# Patient Record
Sex: MALE | Race: WHITE
[De-identification: names, ages, dates, MRNs, and addresses within clinical notes are randomized per-mention and may not be internally consistent; named-entity substitution may affect disease eponyms.]

---

## 2019-12-10 PROBLEM — Z00.00 ENCOUNTER FOR PREVENTIVE HEALTH EXAMINATION: Status: ACTIVE | Noted: 2019-12-10

## 2019-12-11 ENCOUNTER — RECORD ABSTRACTING (OUTPATIENT)
Age: 54
End: 2019-12-11

## 2019-12-11 ENCOUNTER — APPOINTMENT (OUTPATIENT)
Dept: FAMILY MEDICINE | Facility: CLINIC | Age: 54
End: 2019-12-11
Payer: COMMERCIAL

## 2019-12-11 VITALS
OXYGEN SATURATION: 98 % | BODY MASS INDEX: 28.14 KG/M2 | TEMPERATURE: 98.4 F | HEART RATE: 78 BPM | RESPIRATION RATE: 18 BRPM | SYSTOLIC BLOOD PRESSURE: 120 MMHG | WEIGHT: 190 LBS | DIASTOLIC BLOOD PRESSURE: 78 MMHG | HEIGHT: 69 IN

## 2019-12-11 DIAGNOSIS — M77.9 ENTHESOPATHY, UNSPECIFIED: ICD-10-CM

## 2019-12-11 DIAGNOSIS — Z82.49 FAMILY HISTORY OF ISCHEMIC HEART DISEASE AND OTHER DISEASES OF THE CIRCULATORY SYSTEM: ICD-10-CM

## 2019-12-11 DIAGNOSIS — H81.10 BENIGN PAROXYSMAL VERTIGO, UNSPECIFIED EAR: ICD-10-CM

## 2019-12-11 DIAGNOSIS — Z78.9 OTHER SPECIFIED HEALTH STATUS: ICD-10-CM

## 2019-12-11 DIAGNOSIS — M54.16 RADICULOPATHY, LUMBAR REGION: ICD-10-CM

## 2019-12-11 DIAGNOSIS — Z83.6 FAMILY HISTORY OF OTHER DISEASES OF THE RESPIRATORY SYSTEM: ICD-10-CM

## 2019-12-11 DIAGNOSIS — Z83.1 FAMILY HISTORY OF OTHER INFECTIOUS AND PARASITIC DISEASES: ICD-10-CM

## 2019-12-11 DIAGNOSIS — M47.816 SPONDYLOSIS W/OUT MYELOPATHY OR RADICULOPATHY, LUMBAR REGION: ICD-10-CM

## 2019-12-11 PROCEDURE — 99202 OFFICE O/P NEW SF 15 MIN: CPT

## 2019-12-11 NOTE — ASSESSMENT
[FreeTextEntry1] : Referred to PT for triceps tendinitis and vestibular therapy for Vertigo\par Take medication as instructed\par call if not well

## 2019-12-11 NOTE — HISTORY OF PRESENT ILLNESS
[FreeTextEntry8] : Episodes of dizziness, spinning, on and off for the last 4 days accompanied by nausea,\par no vomiting\par Also pain in left triceps for several weeks

## 2019-12-11 NOTE — HEALTH RISK ASSESSMENT
[No falls in past year] : Patient reported no falls in the past year [0] : 2) Feeling down, depressed, or hopeless: Not at all (0) [[id number]] : 0

## 2020-01-04 ENCOUNTER — TRANSCRIPTION ENCOUNTER (OUTPATIENT)
Age: 55
End: 2020-01-04

## 2020-09-30 ENCOUNTER — APPOINTMENT (OUTPATIENT)
Dept: FAMILY MEDICINE | Facility: CLINIC | Age: 55
End: 2020-09-30
Payer: COMMERCIAL

## 2020-09-30 VITALS
HEIGHT: 69 IN | RESPIRATION RATE: 16 BRPM | WEIGHT: 185 LBS | TEMPERATURE: 98.7 F | SYSTOLIC BLOOD PRESSURE: 132 MMHG | DIASTOLIC BLOOD PRESSURE: 70 MMHG | OXYGEN SATURATION: 99 % | BODY MASS INDEX: 27.4 KG/M2 | HEART RATE: 68 BPM

## 2020-09-30 DIAGNOSIS — Z23 ENCOUNTER FOR IMMUNIZATION: ICD-10-CM

## 2020-09-30 DIAGNOSIS — K14.0 GLOSSITIS: ICD-10-CM

## 2020-09-30 PROCEDURE — 99213 OFFICE O/P EST LOW 20 MIN: CPT | Mod: 25

## 2020-09-30 PROCEDURE — 90682 RIV4 VACC RECOMBINANT DNA IM: CPT

## 2020-09-30 PROCEDURE — 90471 IMMUNIZATION ADMIN: CPT

## 2020-09-30 RX ORDER — NAPROXEN 500 MG/1
500 TABLET ORAL
Qty: 30 | Refills: 0 | Status: DISCONTINUED | COMMUNITY
Start: 2019-12-11 | End: 2020-09-30

## 2020-09-30 RX ORDER — MECLIZINE HYDROCHLORIDE 25 MG/1
25 TABLET ORAL 3 TIMES DAILY
Qty: 45 | Refills: 0 | Status: DISCONTINUED | COMMUNITY
Start: 2019-12-11 | End: 2020-09-30

## 2020-09-30 RX ORDER — LIDOCAINE HYDROCHLORIDE 20 MG/ML
2 SOLUTION ORAL; TOPICAL
Qty: 450 | Refills: 1 | Status: ACTIVE | COMMUNITY
Start: 2020-09-30 | End: 1900-01-01

## 2020-09-30 NOTE — PLAN
[FreeTextEntry1] : Rx for viscous lidoc\par To see ENT, dr Del Rio if not improved\par received flu shot

## 2020-09-30 NOTE — HISTORY OF PRESENT ILLNESS
[FreeTextEntry8] : Burning sensation on tongue and  "bumpy" feeling\par above complaint  x 5 days\par Need flu shot

## 2020-09-30 NOTE — HEALTH RISK ASSESSMENT
[No falls in past year] : Patient reported no falls in the past year [0] : 2) Feeling down, depressed, or hopeless: Not at all (0) [[id number]] : 0

## 2023-09-12 ENCOUNTER — APPOINTMENT (OUTPATIENT)
Dept: FAMILY MEDICINE | Facility: CLINIC | Age: 58
End: 2023-09-12
Payer: COMMERCIAL

## 2023-09-12 VITALS
DIASTOLIC BLOOD PRESSURE: 78 MMHG | HEART RATE: 86 BPM | HEIGHT: 69 IN | OXYGEN SATURATION: 98 % | TEMPERATURE: 98 F | WEIGHT: 190 LBS | BODY MASS INDEX: 28.14 KG/M2 | RESPIRATION RATE: 16 BRPM | SYSTOLIC BLOOD PRESSURE: 134 MMHG

## 2023-09-12 DIAGNOSIS — F10.10 ALCOHOL ABUSE, UNCOMPLICATED: ICD-10-CM

## 2023-09-12 DIAGNOSIS — G62.89 OTHER SPECIFIED POLYNEUROPATHIES: ICD-10-CM

## 2023-09-12 PROCEDURE — 36415 COLL VENOUS BLD VENIPUNCTURE: CPT

## 2023-09-12 PROCEDURE — 99214 OFFICE O/P EST MOD 30 MIN: CPT | Mod: 25

## 2023-09-13 ENCOUNTER — LABORATORY RESULT (OUTPATIENT)
Age: 58
End: 2023-09-13

## 2023-09-13 LAB
25(OH)D3 SERPL-MCNC: 28.6 NG/ML
ALBUMIN SERPL ELPH-MCNC: 4.9 G/DL
ALP BLD-CCNC: 74 U/L
ALT SERPL-CCNC: 31 U/L
ANION GAP SERPL CALC-SCNC: 14 MMOL/L
AST SERPL-CCNC: 20 U/L
BILIRUB SERPL-MCNC: 0.7 MG/DL
BUN SERPL-MCNC: 12 MG/DL
CALCIUM SERPL-MCNC: 9.9 MG/DL
CHLORIDE SERPL-SCNC: 102 MMOL/L
CHOLEST SERPL-MCNC: 144 MG/DL
CO2 SERPL-SCNC: 24 MMOL/L
CREAT SERPL-MCNC: 0.82 MG/DL
CRP SERPL-MCNC: <3 MG/L
EGFR: 104 ML/MIN/1.73M2
ERYTHROCYTE [SEDIMENTATION RATE] IN BLOOD BY WESTERGREN METHOD: 10 MM/HR
ESTIMATED AVERAGE GLUCOSE: 126 MG/DL
FOLATE SERPL-MCNC: 10.3 NG/ML
GLUCOSE SERPL-MCNC: 163 MG/DL
HBA1C MFR BLD HPLC: 6 %
HDLC SERPL-MCNC: 48 MG/DL
LDLC SERPL CALC-MCNC: 72 MG/DL
NONHDLC SERPL-MCNC: 96 MG/DL
POTASSIUM SERPL-SCNC: 4.8 MMOL/L
PROT SERPL-MCNC: 6.8 G/DL
PSA SERPL-MCNC: 0.43 NG/ML
SODIUM SERPL-SCNC: 140 MMOL/L
TRIGL SERPL-MCNC: 135 MG/DL
TSH SERPL-ACNC: 2.23 UIU/ML
VIT B12 SERPL-MCNC: 384 PG/ML

## 2023-09-14 LAB
ALBUMIN MFR SERPL ELPH: 67.7 %
ALBUMIN SERPL-MCNC: 4.6 G/DL
ALBUMIN/GLOB SERPL: 2.1 RATIO
ALPHA1 GLOB MFR SERPL ELPH: 3.7 %
ALPHA1 GLOB SERPL ELPH-MCNC: 0.3 G/DL
ALPHA2 GLOB MFR SERPL ELPH: 7.3 %
ALPHA2 GLOB SERPL ELPH-MCNC: 0.5 G/DL
B-GLOBULIN MFR SERPL ELPH: 10.4 %
B-GLOBULIN SERPL ELPH-MCNC: 0.7 G/DL
BASOPHILS # BLD AUTO: 0.03 K/UL
BASOPHILS NFR BLD AUTO: 0.5 %
DEPRECATED KAPPA LC FREE/LAMBDA SER: 1.54 RATIO
EOSINOPHIL # BLD AUTO: 0.08 K/UL
EOSINOPHIL NFR BLD AUTO: 1.4 %
GAMMA GLOB FLD ELPH-MCNC: 0.7 G/DL
GAMMA GLOB MFR SERPL ELPH: 10.9 %
HCT VFR BLD CALC: 46.2 %
HGB BLD-MCNC: 15 G/DL
IGA SER QL IEP: 195 MG/DL
IGG SER QL IEP: 817 MG/DL
IGM SER QL IEP: 76 MG/DL
IMM GRANULOCYTES NFR BLD AUTO: 0.5 %
INTERPRETATION SERPL IEP-IMP: NORMAL
KAPPA LC CSF-MCNC: 0.94 MG/DL
KAPPA LC SERPL-MCNC: 1.45 MG/DL
LYMPHOCYTES # BLD AUTO: 1.36 K/UL
LYMPHOCYTES NFR BLD AUTO: 23.7 %
M PROTEIN SPEC IFE-MCNC: NORMAL
MAN DIFF?: NORMAL
MCHC RBC-ENTMCNC: 31.2 PG
MCHC RBC-ENTMCNC: 32.5 GM/DL
MCV RBC AUTO: 96 FL
MONOCYTES # BLD AUTO: 0.35 K/UL
MONOCYTES NFR BLD AUTO: 6.1 %
NEUTROPHILS # BLD AUTO: 3.89 K/UL
NEUTROPHILS NFR BLD AUTO: 67.8 %
PLATELET # BLD AUTO: 177 K/UL
PROT SERPL-MCNC: 6.8 G/DL
PROT SERPL-MCNC: 6.8 G/DL
RBC # BLD: 4.81 M/UL
RBC # FLD: 13.3 %
WBC # FLD AUTO: 5.74 K/UL

## 2023-11-30 ENCOUNTER — APPOINTMENT (OUTPATIENT)
Dept: FAMILY MEDICINE | Facility: CLINIC | Age: 58
End: 2023-11-30
Payer: COMMERCIAL

## 2023-11-30 VITALS
WEIGHT: 199 LBS | OXYGEN SATURATION: 99 % | BODY MASS INDEX: 29.47 KG/M2 | DIASTOLIC BLOOD PRESSURE: 86 MMHG | SYSTOLIC BLOOD PRESSURE: 153 MMHG | HEART RATE: 72 BPM | HEIGHT: 69 IN

## 2023-11-30 DIAGNOSIS — M77.11 LATERAL EPICONDYLITIS, RIGHT ELBOW: ICD-10-CM

## 2023-11-30 PROCEDURE — 99212 OFFICE O/P EST SF 10 MIN: CPT

## 2024-02-07 ENCOUNTER — TRANSCRIPTION ENCOUNTER (OUTPATIENT)
Age: 59
End: 2024-02-07